# Patient Record
Sex: FEMALE | Race: BLACK OR AFRICAN AMERICAN | NOT HISPANIC OR LATINO | Employment: UNEMPLOYED | URBAN - METROPOLITAN AREA
[De-identification: names, ages, dates, MRNs, and addresses within clinical notes are randomized per-mention and may not be internally consistent; named-entity substitution may affect disease eponyms.]

---

## 2017-01-19 ENCOUNTER — DOCUMENTATION ONLY (OUTPATIENT)
Dept: CARDIOLOGY | Facility: CLINIC | Age: 77
End: 2017-01-19

## 2017-01-19 DIAGNOSIS — I34.0 MITRAL VALVE INSUFFICIENCY, UNSPECIFIED ETIOLOGY: Primary | ICD-10-CM

## 2017-01-19 DIAGNOSIS — I50.9 CONGESTIVE HEART FAILURE, UNSPECIFIED CONGESTIVE HEART FAILURE CHRONICITY, UNSPECIFIED CONGESTIVE HEART FAILURE TYPE: ICD-10-CM

## 2017-01-19 NOTE — PROGRESS NOTES
Patient referred to Dr. Munson for MitralClip evaluation from [facility], Dr. Barahona.  Spoke with granddaughter.  She will mail copy of CCFD disc to [facility].  Also requested records from [facility].  Once records are received, Dr. Munson will review and we will contact patient to schedule appointments.  Office address and phone number provided.  Granddaughter, Maksim, verbalized understanding.

## 2017-02-07 ENCOUNTER — OFFICE VISIT (OUTPATIENT)
Dept: CARDIOTHORACIC SURGERY | Facility: CLINIC | Age: 77
End: 2017-02-07
Payer: MEDICARE

## 2017-02-07 ENCOUNTER — HOSPITAL ENCOUNTER (OUTPATIENT)
Dept: CARDIOLOGY | Facility: CLINIC | Age: 77
Discharge: HOME | End: 2017-02-07
Payer: MEDICARE

## 2017-02-07 ENCOUNTER — OFFICE VISIT (OUTPATIENT)
Dept: CARDIOLOGY | Facility: CLINIC | Age: 77
End: 2017-02-07
Payer: MEDICARE

## 2017-02-07 VITALS
OXYGEN SATURATION: 98 % | WEIGHT: 143.94 LBS | DIASTOLIC BLOOD PRESSURE: 94 MMHG | HEART RATE: 56 BPM | BODY MASS INDEX: 27.18 KG/M2 | SYSTOLIC BLOOD PRESSURE: 142 MMHG | HEIGHT: 61 IN

## 2017-02-07 VITALS
SYSTOLIC BLOOD PRESSURE: 150 MMHG | OXYGEN SATURATION: 100 % | BODY MASS INDEX: 26.35 KG/M2 | HEIGHT: 62 IN | WEIGHT: 143.19 LBS | TEMPERATURE: 98 F | HEART RATE: 73 BPM | DIASTOLIC BLOOD PRESSURE: 84 MMHG

## 2017-02-07 DIAGNOSIS — I34.0 MITRAL VALVE INSUFFICIENCY, UNSPECIFIED ETIOLOGY: ICD-10-CM

## 2017-02-07 DIAGNOSIS — Z98.890 HX OF REPAIR OF DISSECTING THORACIC AORTIC ANEURYSM, STANFORD TYPE B: ICD-10-CM

## 2017-02-07 DIAGNOSIS — I34.0 NON-RHEUMATIC MITRAL REGURGITATION: ICD-10-CM

## 2017-02-07 DIAGNOSIS — Z86.79 HX OF REPAIR OF DISSECTING THORACIC AORTIC ANEURYSM, STANFORD TYPE B: ICD-10-CM

## 2017-02-07 DIAGNOSIS — I34.0 NON-RHEUMATIC MITRAL REGURGITATION: Primary | ICD-10-CM

## 2017-02-07 DIAGNOSIS — I47.10 PAROXYSMAL SVT (SUPRAVENTRICULAR TACHYCARDIA): ICD-10-CM

## 2017-02-07 DIAGNOSIS — I10 ESSENTIAL HYPERTENSION: ICD-10-CM

## 2017-02-07 DIAGNOSIS — N18.9 CKD (CHRONIC KIDNEY DISEASE), UNSPECIFIED STAGE: ICD-10-CM

## 2017-02-07 PROBLEM — E11.9 DIABETES: Status: ACTIVE | Noted: 2017-02-07

## 2017-02-07 LAB
AORTIC VALVE REGURGITATION: ABNORMAL
DIASTOLIC DYSFUNCTION: YES
ESTIMATED PA SYSTOLIC PRESSURE: 36.64
GLOBAL PERICARDIAL EFFUSION: ABNORMAL
MITRAL VALVE MOBILITY: ABNORMAL
MITRAL VALVE REGURGITATION: ABNORMAL
RETIRED EF AND QEF - SEE NOTES: 65 (ref 55–65)
TRICUSPID VALVE REGURGITATION: ABNORMAL

## 2017-02-07 PROCEDURE — 99215 OFFICE O/P EST HI 40 MIN: CPT | Mod: S$PBB,,, | Performed by: INTERNAL MEDICINE

## 2017-02-07 PROCEDURE — 99999 PR PBB SHADOW E&M-EST. PATIENT-LVL III: CPT | Mod: PBBFAC,,, | Performed by: THORACIC SURGERY (CARDIOTHORACIC VASCULAR SURGERY)

## 2017-02-07 PROCEDURE — 99213 OFFICE O/P EST LOW 20 MIN: CPT | Mod: PBBFAC,27

## 2017-02-07 PROCEDURE — 99205 OFFICE O/P NEW HI 60 MIN: CPT | Mod: S$PBB,,, | Performed by: THORACIC SURGERY (CARDIOTHORACIC VASCULAR SURGERY)

## 2017-02-07 PROCEDURE — 93306 TTE W/DOPPLER COMPLETE: CPT | Mod: 26,S$PBB,, | Performed by: INTERNAL MEDICINE

## 2017-02-07 PROCEDURE — 99999 PR PBB SHADOW E&M-EST. PATIENT-LVL III: CPT | Mod: PBBFAC,,,

## 2017-02-07 RX ORDER — METOPROLOL TARTRATE 25 MG/1
25 TABLET, FILM COATED ORAL 2 TIMES DAILY
COMMUNITY
Start: 2016-07-30 | End: 2019-09-06 | Stop reason: ALTCHOICE

## 2017-02-07 RX ORDER — ASPIRIN 81 MG/1
81 TABLET ORAL DAILY
COMMUNITY
Start: 2016-04-28 | End: 2019-09-06 | Stop reason: ALTCHOICE

## 2017-02-07 RX ORDER — LYSINE HCL 500 MG
1 TABLET ORAL 2 TIMES DAILY
COMMUNITY

## 2017-02-07 RX ORDER — ERGOCALCIFEROL 1.25 MG/1
50000 CAPSULE ORAL
COMMUNITY
End: 2019-09-06 | Stop reason: ALTCHOICE

## 2017-02-07 RX ORDER — CLOPIDOGREL BISULFATE 75 MG/1
75 TABLET ORAL DAILY
COMMUNITY
Start: 2016-10-03 | End: 2019-09-06 | Stop reason: ALTCHOICE

## 2017-02-07 RX ORDER — SIMVASTATIN 40 MG/1
40 TABLET, FILM COATED ORAL NIGHTLY
COMMUNITY
Start: 2016-10-03 | End: 2019-09-06 | Stop reason: ALTCHOICE

## 2017-02-07 RX ORDER — LANSOPRAZOLE 30 MG/1
30 CAPSULE, DELAYED RELEASE ORAL DAILY
COMMUNITY
Start: 2016-07-11

## 2017-02-07 NOTE — PROGRESS NOTES
Subjective:    Patient ID:  Vinita Jaramillo is a 76 y.o. female who presents for evaluation of Mitral Regurgitation    HPI    76 y.o. AA female w Hx of  SVTs, Severe mitral regurgitation, type B aortic dissection s/p endovascular repair (May 2016),HTN, borderline DMII,and CKD, who presents today for evaluation of severe MR noted at the time of the endovascular repair.     The patient lives with her daughter in [place], [place]. She is somewhat physically active , does aerobic exercise 10-20 minutes a day, walks around the block but much of her household work is done by her daughter. She denies having any chest pain or SOB. No exertional symptoms with her daily physical activities. Denies any restrictions of her functional capacity.     Per records; She reported  that she does not want open heart surgery, and thus presents for evaluation of possible mitral clip repair.     Review of Systems   Constitution: Negative for chills, decreased appetite, fever, weight gain and weight loss.   HENT: Negative for congestion, ear pain and sore throat.    Eyes: Negative.    Cardiovascular: Positive for irregular heartbeat (in the past) and palpitations (last year). Negative for chest pain, claudication, cyanosis, dyspnea on exertion, leg swelling, near-syncope and syncope.   Respiratory: Negative.    Hematologic/Lymphatic: Negative.    Skin: Negative.    Musculoskeletal: Negative.    Gastrointestinal: Negative.    Genitourinary: Negative.    Neurological: Negative.  Negative for dizziness, focal weakness, light-headedness and loss of balance.        Objective:    Physical Exam   Constitutional: She is oriented to person, place, and time. She appears well-developed and well-nourished. No distress.   HENT:   Head: Normocephalic and atraumatic.   Neck: Neck supple. No JVD present.   Cardiovascular: Normal rate, regular rhythm and intact distal pulses.    Murmur heard.   Blowing crescendo-decrescendo mid to late systolic murmur is present  with a grade of 3/6  at the apex  Pulmonary/Chest: Effort normal and breath sounds normal. No respiratory distress. She has no wheezes. She has no rales.   Abdominal: Soft. Bowel sounds are normal. She exhibits no distension. There is no tenderness. There is no rebound.   Musculoskeletal: Normal range of motion. She exhibits no edema.   Neurological: She is alert and oriented to person, place, and time.   Skin: Skin is warm and dry.   Psychiatric: She has a normal mood and affect. Her behavior is normal.         Assessment:       1. Non-rheumatic mitral regurgitation    2. Hx of repair of dissecting thoracic aortic aneurysm, Kress type B    3. Essential hypertension    4. Paroxysmal SVT (supraventricular tachycardia)    5. CKD (chronic kidney disease), unspecified stage         Plan:           continue medical therapy.   Follow up echo in 6 mo.    Judy Paez MD  Interventional Cardiology Fellow, PGY-9  2/7/2017 2:50 PM         I have personally taken the history and examined this patient. I have discussed and agree with the resident's findings and plan as documented in the resident's note.  Mitral valve is thickened with evidence of mild systolic prolapse. The calculated mitral valve area is 10.0 cm2. There is moderate to severe mitral regurgitation. NYHA I .   She is not a candidate for Mitral Clip at this time. Recommended patient follow up with Dr. Sanchez for serial ECHO and return to see me if she becomes symptomatic despite medical therapy.    Iain Munson

## 2017-02-07 NOTE — LETTER
February 7, 2017      Iain Munson MD  [address]  [facility] [address]           [facility]  [address]  [facility] [address]  Phone: [phone]          Patient: Vinita Jaramillo   MR Number: [id number]   YOB: 1940   Date of Visit: 2/7/2017       Dear Dr. Iain Munson:    Thank you for referring Vinita Jaramillo to me for evaluation. Attached you will find relevant portions of my assessment and plan of care.    If you have questions, please do not hesitate to call me. I look forward to following Vinita Jaramillo along with you.    Sincerely,    Doug Cobb MD    Enclosure  CC:  No Recipients    If you would like to receive this communication electronically, please contact [email or web address] or [phone] to request more information on [employer] access.    For providers and/or their staff who would like to refer a patient to [facility], please contact us through our one-stop-shop provider referral line, [facility], at [phone].    If you feel you have received this communication in error or would no longer like to receive these types of communications, please e-mail [email or web address]

## 2017-02-07 NOTE — PROGRESS NOTES
History & Physical    SUBJECTIVE:     History of Present Illness:  Patient is a 76 y.o. female with a past medical history of HTN, DMII, CKD, and type B aortic dissection s/p endovascular repair one year ago. She presents for evaluation of severe MR noted at the time of the endovascular repair. Of note she is completely asymptomatic from the MR. She denies orthopnea, dyspnea on exertion, chest pain, any SOB, and reports that she can walk many blocks without issue, can attend to her activities of daily living, and feels well overall. She reports that she does not want open heart surgery, and thus presents for evaluation of possible mitral clip repair. Today she feels well, denies SOB, CP, fevers, nausea, diarrhea, constipation, chills, night sweats, or any other new or concerning symptoms.    Chief Complaint   Patient presents with    Consult       Review of patient's allergies indicates:   Allergen Reactions    No known drug allergies        Current Outpatient Prescriptions   Medication Sig Dispense Refill    aspirin (ECOTRIN) 81 MG EC tablet Take 81 mg by mouth.      calcium carbonate-vit D3-min 600 mg calcium- 400 unit Tab Take by mouth.      clopidogrel (PLAVIX) 75 mg tablet Take 75 mg by mouth.      lansoprazole (PREVACID) 30 MG capsule Take 30 mg by mouth.      metoprolol tartrate (LOPRESSOR) 25 MG tablet Take 25 mg by mouth.      MISCELLANEOUS MEDICAL SUPPLY MISC Rolling walker      multivitamin Chew Take 1 tablet by mouth.      simvastatin (ZOCOR) 40 MG tablet Take 40 mg by mouth.       No current facility-administered medications for this visit.        No past medical history on file.  No past surgical history on file.  No family history on file.  Social History   Substance Use Topics    Smoking status: Never Smoker    Smokeless tobacco: Never Used    Alcohol use No        Review of Systems:  Review of Systems   Constitutional: Negative for chills and fever.   HENT: Negative.    Eyes: Negative.   "  Respiratory: Negative.  Negative for shortness of breath.    Cardiovascular: Negative for chest pain.   Gastrointestinal: Negative for abdominal pain, diarrhea, nausea and vomiting.   Endocrine: Negative.    Genitourinary: Negative.    Musculoskeletal: Negative.    Skin: Negative.    Allergic/Immunologic: Negative.    Neurological: Negative.    Psychiatric/Behavioral: Negative.        OBJECTIVE:     Vital Signs (Most Recent)  Temp: 98.2 °F (36.8 °C) (02/07/17 1023)  Pulse: 73 (02/07/17 1023)  BP: (!) 150/84 (02/07/17 1023)  SpO2: 100 % (02/07/17 1023)  5' 2" (1.575 m)  65 kg (143 lb 3.2 oz)     Physical Exam:  Physical Exam   Constitutional: She is oriented to person, place, and time. She appears well-developed and well-nourished. No distress.   HENT:   Head: Normocephalic and atraumatic.   Eyes: Pupils are equal, round, and reactive to light. No scleral icterus.   Neck: No tracheal deviation present.   Cardiovascular: Normal rate.    Murmur heard.   Systolic murmur is present with a grade of 3/6   Murmur consistent with mitral regurgitation   Pulmonary/Chest: Effort normal. No respiratory distress.   Abdominal: Soft. She exhibits no distension. There is no tenderness.   Neurological: She is alert and oriented to person, place, and time. No cranial nerve deficit.   Psychiatric: She has a normal mood and affect. Her behavior is normal. Thought content normal.     Diagnostic Results:  Echo from [facility] reviewed    ASSESSMENT/PLAN:     76 y.o. female with asymptomatic severe mitral valve regurgitation    Patient is asymptomatic, however nonetheless not inoperable by any rubric we can discern, as thus is not a endovascular repair candidate  If patient re-considers and desires open repair, we can discuss open repair in future      Alessio Beltran MD PGY II  [phone]      CTS Attending Note:    I have personally taken the history and examined this patient and agree with the resident's note as stated above. 75 yo F with mod/sev " MR. She is a poor historian. Does not seem symptomatic. Since her TEVAR, has not driven her car. She lives with her daughter. She had trouble with walking and memory after her TEVAR. Given her LA size, her MR is long-standing. She has preserved LV and RV function, without ventricular dilation. No PH. I would not recommend surgical repair as risk >> benefit.

## 2017-02-07 NOTE — MR AVS SNAPSHOT
[facility]  [address]  [facility] [address]  Phone: [phone]                  Vinita Jaramillo   2017 10:00 AM   Appointment    Description:  Female : 1940   Provider:  Doug Cobb MD   Department:  [facility] - Cardiovascular Surg                To Do List           Future Appointments        Provider Department Dept Phone    2017 8:45 AM ECHO, [facility] - Echo/Stress Lab [phone]    2017 10:00 AM Doug Cobb MD [facility] - Cardiovascular [facility]urg [phone]    2017 12:15 PM LAB, APPOINTMENT [place] [facility] [phone]    2017 2:00 PM [facility] [phone]      Goals (5 Years of Data)     None      [facility]     [facility] Nurse Care Line -  Assistance  Registered nurses in the [facility] Center provide clinical advisement, health education, appointment booking, and other advisory services.  Call for this free service at [phone].             Medications           Message regarding Medications     Verify the changes and/or additions to your medication regime listed below are the same as discussed with your clinician today.  If any of these changes or additions are incorrect, please notify your healthcare provider.             Verify that the below list of medications is an accurate representation of the medications you are currently taking.  If none reported, the list may be blank. If incorrect, please contact your healthcare provider. Carry this list with you in case of emergency.                Clinical Reference Information           Allergies as of 2017     Not on File      Immunizations Administered on Date of Encounter - 2017     None      [facility] Sign-Up     Activating your [facility] account is as easy as 1-2-3!     1) Visit [email or web address], select Sign Up Now, enter this activation code and your date of birth, then  select Next.  [email or web address]  Expires: 3/19/2017  9:16 AM      2) Create a username and password to use when you visit [facility] in the future and select a security question in case you lose your password and select Next.    3) Enter your e-mail address and click Sign Up!    Additional Information  If you have questions, please e-mail [email or web address] or call [phone] to talk to our [facility] staff. Remember, [facility] is NOT to be used for urgent needs. For medical emergencies, dial 911.

## 2017-02-07 NOTE — MR AVS SNAPSHOT
[facility]  [address]  [facility] [address]  Phone: [phone]                  Vinita Jaramillo   2017 2:00 PM   Office Visit    Description:  Female : 1940   Provider:  [facility]   Department:  [facility]           Reason for Visit     Mitral Regurgitation           Diagnoses this Visit        Comments    Non-rheumatic mitral regurgitation    -  Primary     Hx of repair of dissecting thoracic aortic aneurysm, Robbins type B         Essential hypertension         Paroxysmal SVT (supraventricular tachycardia)         CKD (chronic kidney disease), unspecified stage                To Do List           Goals (5 Years of Data)     None      [facility]     [facility] Care Line -  Assistance  Registered nurses in the [facility] provide clinical advisement, health education, appointment booking, and other advisory services.  Call for this free service at [phone].             Medications           Message regarding Medications     Verify the changes and/or additions to your medication regime listed below are the same as discussed with your clinician today.  If any of these changes or additions are incorrect, please notify your healthcare provider.             Verify that the below list of medications is an accurate representation of the medications you are currently taking.  If none reported, the list may be blank. If incorrect, please contact your healthcare provider. Carry this list with you in case of emergency.           Current Medications     aspirin (ECOTRIN) 81 MG EC tablet Take 81 mg by mouth once daily.     calcium carbonate-vit D3-min 600 mg calcium- 400 unit Tab Take 1 tablet by mouth 2 (two) times daily.     clopidogrel (PLAVIX) 75 mg tablet Take 75 mg by mouth once daily.     ergocalciferol (VITAMIN D2) 50,000 unit Cap Take 50,000 Units by mouth every 7 days.    lansoprazole (PREVACID) 30 MG capsule  "Take 30 mg by mouth once daily.     metoprolol tartrate (LOPRESSOR) 25 MG tablet Take 25 mg by mouth 2 (two) times daily.     MISCELLANEOUS MEDICAL SUPPLY MISC Rolling walker    multivitamin Chew Take 1 tablet by mouth.    simvastatin (ZOCOR) 40 MG tablet Take 40 mg by mouth every evening.            Clinical Reference Information           Your Vitals Were     BP Pulse Height Weight SpO2 BMI    142/94 (BP Location: Left arm, Patient Position: Sitting, BP Method: Manual) 56 5' 1" (1.549 m) 65.3 kg (143 lb 15.4 oz) 98% 27.2 kg/m2      Blood Pressure          Most Recent Value    Right Arm BP - Sitting  148/90    Left Arm BP - Sitting  142/94    BP  (!)  142/94      Allergies as of 2/7/2017     No Known Drug Allergies      Immunizations Administered on Date of Encounter - 2/7/2017     None      [facility] Sign-Up     Activating your [facility] account is as easy as 1-2-3!     1) Visit [email or web address], select Sign Up Now, enter this activation code and your date of birth, then select Next.  [email or web address]  Expires: 3/19/2017  9:16 AM      2) Create a username and password to use when you visit [facility] in the future and select a security question in case you lose your password and select Next.    3) Enter your e-mail address and click Sign Up!    Additional Information  If you have questions, please e-mail [email or web address] or call [phone] to talk to our [facility] staff. Remember, [facility] is NOT to be used for urgent needs. For medical emergencies, dial 911.         Language Assistance Services     ATTENTION: Language assistance services are available, free of charge. Please call [phone].      ATENCIÓN: Si habla brit, tiene a delgado disposición servicios gratuitos de asistencia lingüística. Llame al [phone].     CHÚ Ý: N?u b?n nói Ti?ng Vi?t, có các d?ch v? h? tr? ngôn ng? mi?n phí dành cho b?n. G?i s? [phone].         [facility] complies with applicable Federal civil " rights laws and does not discriminate on the basis of race, color, national origin, age, disability, or sex.

## 2019-09-05 PROBLEM — I48.0 PAROXYSMAL ATRIAL FIBRILLATION: Status: ACTIVE | Noted: 2019-09-05

## 2019-09-05 PROBLEM — I25.2 HISTORY OF NON-ST ELEVATION MYOCARDIAL INFARCTION (NSTEMI): Status: ACTIVE | Noted: 2019-09-05

## 2019-09-05 PROBLEM — I50.22 CHRONIC SYSTOLIC HEART FAILURE: Status: ACTIVE | Noted: 2019-09-05

## 2019-09-06 PROBLEM — I48.92 ATRIAL FIBRILLATION AND FLUTTER: Status: ACTIVE | Noted: 2019-09-05

## 2019-09-06 PROBLEM — R78.81 BACTEREMIA DUE TO GRAM-POSITIVE BACTERIA: Status: ACTIVE | Noted: 2019-09-06

## 2019-09-06 PROBLEM — I48.91 ATRIAL FIBRILLATION AND FLUTTER: Status: ACTIVE | Noted: 2019-09-05

## 2019-10-16 PROBLEM — N95.0 PMB (POSTMENOPAUSAL BLEEDING): Status: ACTIVE | Noted: 2019-10-16
